# Patient Record
Sex: MALE
[De-identification: names, ages, dates, MRNs, and addresses within clinical notes are randomized per-mention and may not be internally consistent; named-entity substitution may affect disease eponyms.]

---

## 2017-05-12 NOTE — CP.PCM.HP
History of Present Illness





- History of Present Illness


History of Present Illness: 


CC:acute diplopia, mild dysarthria and aphasia x a few minutes





51 y/o M with Hx of CVA 2015, A fib, HTN, CHF, dilated cardiomyopathy ( EF 10-15

% on last ECHO 3/17, s/p AICD placement 2015), Polycythemia Vera presenting 

with diplopia and mild dysarthria and aphasia which started at 4 pm as per 

patient, lasted for a few minutes and resolved on its own.  Associated with 

some dizziness. No chest pain, sob, cough, focal weakness or numbness, no LOC, 

no incontinence. 





PMH: Polycythemia Vera, CVA 12/15, A fib,HTN, CHF, dilated cardiomyopathy ( EF 

10-15% on last ECHO 3/17, s/p AICD placement 2015)


PSH: AICD 2015, cardiac cath 2014, cholecystectomy


Allergies: NKDA


Meds: per ECW


Taking Aspir-81 81 MG Tablet Delayed Release 


Taking Atorvastatin Calcium 40 MG Tablet 


Taking Digoxin 125 MCG Tablet 


Continue Lisinopril 10 MG Tablet 


Continue Carvedilol 12.5 MG Tablet 


Continue Furosemide 20 MG Tablet 


Continue Warfarin Sodium 2.5 MG Tablet





PMD: Dr Quinn


Cardio: Dr Kessler


Heme/Onc: Dr Montoya





ED course:


NIHSS score 2


code stroke


neuro consulted


ct head w/o contrast: no acute hemorrhage


cbc,cmp, lipids, coags, troponin x1


EKG, CXR








Present on Admission





- Present on Admission


Any Indicators Present on Admission: No





Review of Systems





- Review of Systems


Review of Systems: 





see hpi





Past Patient History





- Infectious Disease


Hx of Infectious Diseases: None





- Past Social History


Smoking Status: Never Smoked





- CARDIAC


Hx Atrial Fibrillation: Yes (pt had been on coumadin but recently switched to 

zarelta)


Hx Cardia Arrhythmia: Yes


Hx Congestive Heart Failure: Yes


Hx Hypertension: Yes


Hx Pacemaker: No





- NEUROLOGICAL


HX Cerebrovascular Accident: Yes (no residual weakness)


Hx Paralysis: No





- RENAL


Hx Chronic Kidney Disease: No





- ENDOCRINE/METABOLIC


Hx Endocrine Disorders: No





- HEMATOLOGICAL/ONCOLOGICAL


Hx Blood Transfusions: No


Hx Blood Transfusion Reaction: No





- INTEGUMENTARY


Hx Dermatological Problems: No





- MUSCULOSKELETAL/RHEUMATOLOGICAL


Hx Musculoskeletal Disorders: No





- GASTROINTESTINAL


Hx Gastrointestinal Disorders: No





- GENITOURINARY/GYNECOLOGICAL


Hx Genitourinary Disorders: No





- PSYCHIATRIC


Hx Psychophysiologic Disorder: No





- SURGICAL HISTORY


Hx Cholecystectomy: Yes





- ANESTHESIA


Hx Anesthesia Reactions: No


Hx Malignant Hyperthermia: No





Meds


Allergies/Adverse Reactions: 


 Allergies











Allergy/AdvReac Type Severity Reaction Status Date / Time


 


No Known Allergies Allergy   Verified 03/27/17 10:32














Physical Exam





- Constitutional


Appears: Non-toxic, No Acute Distress





- Head Exam


Head Exam: ATRAUMATIC





- Eye Exam


Eye Exam: EOMI


Pupil Exam: PERRL





- ENT Exam


ENT Exam: Mucous Membranes Moist





- Neck Exam


Neck exam: Positive for: Full Rom.  Negative for: Tenderness





- Respiratory Exam


Respiratory Exam: Clear to Auscultation Bilateral





- Cardiovascular Exam


Cardiovascular Exam: +S1, +S2





- GI/Abdominal Exam


GI & Abdominal Exam: Normal Bowel Sounds, Soft.  absent: Tenderness





- Extremities Exam


Extremities exam: Positive for: pedal pulses present.  Negative for: calf 

tenderness, pedal edema





- Neurological Exam


Neurological exam: Alert, CN II-XII Intact, Oriented x3





- Expanded Neurological Exam


  ** Expanded


Patient oriented to: person, place, time


Cranial nerves: EOM's Intact: Normal, Facial Sensation: Normal, Nystagmus: 

Normal, Tongue Deviation: Normal


Cerebellar Function: Finger to Nose: Normal, Romberg: Normal


Neuro motor strength exam: Left Upper Extremity: 5, Right Upper Extremity: 5, 

Left Lower Extremity: 5, Right Lower Extremity: 5





- Psychiatric Exam


Psychiatric exam: Normal Affect, Normal Mood





Results





- Vital Signs


Recent Vital Signs: 





 Last Vital Signs











Temp  98.1 F   05/12/17 17:20


 


Pulse  100 H  05/12/17 17:20


 


Resp  17   05/12/17 17:20


 


BP  140/88   05/12/17 17:20


 


Pulse Ox  100   05/12/17 18:09














- Labs


Result Diagrams: 


 05/12/17 17:50





 05/12/17 17:50


Labs: 





 Laboratory Results - last 24 hr











  05/12/17 05/12/17





  17:50 17:50


 


WBC  7.5 


 


RBC  5.47 


 


Hgb  17.0 


 


Hct  50.7 


 


MCV  92.7 


 


MCH  31.0 


 


MCHC  33.5 


 


RDW  13.3 


 


Plt Count  161 


 


MPV  10.0 


 


Neut % (Auto)  53.0 


 


Lymph % (Auto)  32.9 


 


Mono % (Auto)  6.9 


 


Eos % (Auto)  6.5 H 


 


Baso % (Auto)  0.7 


 


Neut #  4.0 


 


Lymph #  2.4 


 


Mono #  0.5 


 


Eos #  0.5 


 


Baso #  0.0 


 


Sodium   143


 


Potassium   3.9


 


Chloride   105


 


Carbon Dioxide   27


 


Anion Gap   14


 


BUN   15


 


Creatinine   0.8


 


Est GFR ( Amer)   > 60


 


Est GFR (Non-Af Amer)   > 60


 


Random Glucose   98


 


Calcium   8.8


 


Total Bilirubin   0.5


 


AST   34


 


ALT   53


 


Alkaline Phosphatase   69


 


Total Protein   7.8


 


Albumin   4.4


 


Globulin   3.4


 


Albumin/Globulin Ratio   1.3


 


Triglycerides   240 H D


 


Cholesterol   109


 


HDL Cholesterol   35














Assessment & Plan





- Assessment and Plan (Free Text)


Plan: 





51 y/o M with Hx of CVA 2015, A fib, HTN, CHF, dilated cardiomyopathy ( EF 10-15

% on last ECHO 3/17, s/p AICD placement 2015), Polycythemia Vera presenting 

with diplopia and mild dysarthria and aphasia which started at 4 pm as per 

patient, lasted for a few minutes and resolved on its own.  





Diplopia w/ mild dysarthia 


resolved upon coming to the ED, poss 2/2 TIA


ED course:


NIHSS score 2


code stroke


neuro consulted: recommend MRA, MRI but patient has AICD placed


ct head w/o contrast: no acute hemorrhage


swallow screen passed


cbc,cmp, lipids, coags, troponin x1


EKG, CXR


admit to telemetry


neurochecks Q4


cardiac monitoring


BP controlled: c/w lisinopril


c/w lipitor 40 mg daily


continue with home med warfarin since no acute hemorrhage on CT


labs in AM


await neuro recommendations since we cannot perform MRI/MRA due to AICD


PT/OT eval and treat





Afib


c/w warfarin 2.5mg





DCM


AICD placed


EF 10-15% with severe global hypokinesia of ventricle on last ECHO 3/17


c/w lasix daily, ACE, BB





Polycythemia Vera


Hgb 17 


patient sees Dr Montoya as outpatient, no medical intervention for now as per 

patient





PPx


DVT - anticoagulated with warfarin for a fib





Diet 


HH

## 2017-05-12 NOTE — RAD
HISTORY:

code stroke  



COMPARISON:

Comparison chest 12/31/2014 



FINDINGS:



LUNGS:

No active pulmonary disease.



PLEURA:

No significant pleural effusion identified, no pneumothorax apparent.



CARDIOVASCULAR:

Cardiomegaly. Interval placement bipolar pacemaker/defibrillator. 



OSSEOUS STRUCTURES:

No significant abnormalities.



VISUALIZED UPPER ABDOMEN:

Normal.



OTHER FINDINGS:

None.



IMPRESSION:

No acute consolidation.  Cardiomegaly. Interval placement bipolar 

pacemaker/defibrillator.

## 2017-05-12 NOTE — CT
PROCEDURE:  CT scan brain 05/12/2017 



HISTORY:

blurred vision, mild slurred speech



COMPARISON:

No prior study available comparison. 



TECHNIQUE:

Axial computed tomography images were obtained through the head/brain 

without intravenous contrast.  



Radiation dose:



Total exam DLP = 794.87 mGy-cm.



This CT exam was performed using one or more of the following dose 

reduction techniques: Automated exposure control, adjustment of the 

mA and/or kV according to patient size, and/or use of iterative 

reconstruction technique.



FINDINGS:



HEMORRHAGE:

No acute parenchymal, subarachnoid nor extra-axial hemorrhage. 



BRAIN:

Vague area of low attenuation right anterior superior basal ganglia 

consistent with infarct in this location though age-indeterminate ; 

possibility of an acute infarct in this location not excluded.  The 

possibility of an acute component infarct however underlying acute 

infarct cannot be completely excluded Chronic appearing infarct in 

the left posterior corona radiata extending superiorly into left 

inferior centrum semiovale. This infarct appears chronic though not 

appreciated on prior exam. 



In addition, there is a chronic appearing infarct in the left frontal 

subcortical white matter that appears to extend into the anterior 

superior left basal ganglia which was partially visible on the prior 

study. .  This may have represented an acute or subacute infarct on 

the prior exam 



Mild generalized volume loss 



VENTRICLES:

No obstructive hydrocephalus 



CALVARIUM:

No acute calvarial fractures



PARANASAL SINUSES:

Mild to moderate mucosal thickening within the ethmoid air complex 

extending superiorly into the frontal sinus. There is also minor 

mucosal thickening sphenoid sinus.



MASTOID AIR CELLS:

Unremarkable as visualized. No inflammatory changes.



OTHER FINDINGS:

None.



IMPRESSION:

No acute intracranial hemorrhage.  Chronic on infarct left posterior 

coronal radiata extending superior to the left centrum semiovale.  

There is also chronic infarct in the left left frontal subcortical 

white matter which was partially visible on the prior exam.  This 

could have represented an acute or subacute infarct on that prior 

study. There is a 3rd small vague and elliptical shaped area low 

attenuation right anterior superior basal ganglia that most likely 

represents age-indeterminate infarct.  Acute infarct in this location 

not excluded.  Consider followup emergent MRI at patient is currently 

within a treatment window for the tPA therapy. .  Note that these 

findings were discussed with Dr. Esposito at approximately 5:50 p.m. with 

written down and read back verification.



Mild generalized volume loss.



Cyst 



See above discussion for additional findings and details.

## 2017-05-12 NOTE — ED PDOC
HPI:STROKE





- Historian


Historian: Patient (acute onset of blurred vision that resolves in minutes and 

difficulty with speech. h/o CVA in the past. no motor weakness that's new noted.

)





NIHSS Stroke Scale





- Date/Time Evaluation Performed


When Was NIHSS Performed: Baseline





- How Severe is the Stroke


Level of Consciousness: 0=Alert


LOC to Questions: 0=Both comments correct


LOC to commands: 0=Obeys both correctly


Best Gaze: 0=Normal


Visual: 0=No visual loss


Facial: 0=Normal


Motor Arm - Left: 0=No drift


Motor Arm - Right: 0=No drift


Motor Leg - Left: 0=No drift


Motor Leg - Right: 0=No drift


Limb Ataxia: 0=Absent


Sensory: 0=Normal


Best Language: 1=Mild to moderate aphasia


Dysarthia: 1=Mild to moderate slurring


Extinction & Inattention (Neglect): 0=Normal, no object


Score: 2





rTPA Inclusion/Exclusion





- Refusal of Treatment


Patient Refused Treatment: No





- Inclusion Criteria for Altepase


Patient is 18 years or Older: Yes


The Clinical Diagnosis of Ischemic Stroke That is Causing a Potentially 

Disabling Neurological Deficit: No


Time of Onset is Well Established to be Less Than 270 Minute Before Treatment 

Would Begin: No


Risk/Benefit Discussed With Patient/Family Member Present: Yes





- Exclusion Criteria for Altepase


Uncontrolled Hypertension at Time of Treatment (Systolic BP above 185 or 

Diastolic BP above 110 mmHg): No


Active Internal Bleeding: No


Known Bleeding Diathesis Including but Not Limited to: Platelets Below 100,000/

mm,PTT Above 40 sec After Heparin Use, Current Use of Oral Anitcoagulant With 

INR Greater Than 1.7 or PT Greater Than 15 secs: No


Evidence of an Intracranial Hemorrhage: No


Evidence of Major Acute Infarct With Signs Greater Than 1/3 MCA Territory: No


Suspicion of Subarachnoid Hemorrhage on Pretreatment Evaluation Even if CT Head 

Negative For Hemorrhage: No





- Warning to TPA With Conditions


Following Conditions Weighed Against Anticipated Benefit: Yes


Condition: Stroke Serevity Too Mild





Past Medical History


Reviewed: Historical Data, Nursing Documentation, Vital Signs


Vital Signs: 





 Last Vital Signs











Temp  98.1 F   05/12/17 17:20


 


Pulse  100 H  05/12/17 17:20


 


Resp  17   05/12/17 17:20


 


BP  140/88   05/12/17 17:20


 


Pulse Ox  100   05/12/17 17:20














- Medical History


PMH: Atrial Fibrillation (pt had been on coumadin but recently switched to 

zarelta), Cardia Arrhythmia, CHF, HTN


   Denies: Chronic Kidney Disease





- Surgical History


Surgical History: Cholecystectomy


   Denies: Pacemaker





- Family History


Family History: States: No Known Family Hx





- Living Arrangements


Living Arrangements: With Family





- Social History


Current smoker - smoking cessation education provided: No





- Home Medications


Home Medications: 


 Ambulatory Orders











 Medication  Instructions  Recorded


 


Famotidine [Famotidine] 20 mg PO DAILY 03/25/14


 


Warfarin [Coumadin] 2.5 mg PO DAILY 08/11/14


 


Furosemide [Furosemide] 20 mg PO DAILY 12/31/14


 


Aspirin [Aspirin Chewable] 81 mg PO DAILY 02/01/17


 


Carvedilol [Coreg] 12.5 mg PO BID 02/01/17


 


Lisinopril [Zestril] 10 mg PO DAILY 02/01/17


 


Digoxin [Lanoxin] 0.125 mg PO 03/27/17














- Allergies


Allergies/Adverse Reactions: 


 Allergies











Allergy/AdvReac Type Severity Reaction Status Date / Time


 


No Known Allergies Allergy   Verified 03/27/17 10:32














Review of Systems


ROS Statement: Except As Marked, All Systems Reviewed And Found Negative


Neurological: Positive for: Change in Speech, Other (transiently blurred vision)





Physical Exam





- Reviewed


Nursing Documentation Reviewed: Yes


Vital Signs Reviewed: Yes





- Physical Exam


Appears: Positive for: Well, Non-toxic, No Acute Distress


Head Exam: Positive for: ATRAUMATIC, NORMAL INSPECTION, NORMOCEPHALIC


Skin: Positive for: Normal Color, Warm, DRY


Eye Exam: Positive for: EOMI, Normal appearance, PERRL


ENT: Positive for: Normal ENT Inspection


Neck: Positive for: Normal, Painless ROM


Cardiovascular/Chest: Positive for: Regular Rate, Rhythm


Respiratory: Positive for: CNT, Normal Breath Sounds


Gastrointestinal/Abdominal: Positive for: Normal Exam, Bowel Sounds, Soft


Back: Positive for: Normal Inspection


Extremity: Positive for: Normal ROM


Neurologic/Psych: Positive for: Alert, CNs II-XII, Oriented, Motor/Sensory 

Deficits, Mood/Affect, Cerebellar Tests, Aphasia (mild).  Negative for: Facial 

Droop





- ECG


O2 Sat by Pulse Oximetry: 100





- Radiology


X-Ray: Read By Radiologist





- Critical Care


Total Time (In Min): 30





Medical Decision Making


Medical Decision Making: 


case d/w Dr. Lemos. Admit for workup. 





Disposition





- Clinical Impression


Clinical Impression: 


 Blurred vision, bilateral, Dysarthria, TIA (transient ischemic attack), Stroke








- Patient ED Disposition


Is Patient to be Admitted: Yes


Doctor Will See Patient In The: Hospital


Counseled Patient/Family Regarding: Diagnosis





- Disposition


Disposition: Transfer of Care


Disposition Time: 18:09


Condition: GUARDED





- Pt Status Changed To:


Hospital Disposition Of: Inpatient





- Admit Certification


Admit to Inpatient:: After my assessment, the patient will require 

hospitalization for at least two midnights.  This is because of the severity of 

symptoms shown, intensity of services needed, and/or the medical risk in this 

patient being treated as an outpatient.





- POA


Present On Arrival: None

## 2017-05-13 NOTE — CP.PCM.CON
History of Present Illness





- History of Present Illness


History of Present Illness: 





                                                 Full Note Dictated.














                                                  ?? TIA/?? fresh cerebral 

embolism


                                                  Congestive cardiomyopathy


                                                  Chr A Fib


                                                  S/P AICD Implant


                                                  Therapeutically anticoagulated


                                                  








                                                   Stable from cardiac point of 

view.





Past Patient History





- Infectious Disease


Hx of Infectious Diseases: None





- Past Medical History & Family History


Past Medical History?: Yes





- Past Social History


Smoking Status: Never Smoked





- CARDIAC


Hx Cardiac Disorders: Yes


Hx Atrial Fibrillation: Yes


Hx Cardia Arrhythmia: Yes


Hx Congestive Heart Failure: Yes


Hx Hypertension: Yes


Hx Pacemaker: Yes


Other/Comment: Dilated cardiomyopathy





- PULMONARY


Hx Respiratory Disorders: No





- NEUROLOGICAL


Hx Neurological Disorder: Yes


HX Cerebrovascular Accident: Yes (no residual weakness)





- HEENT


Hx HEENT Problems: No





- RENAL


Hx Chronic Kidney Disease: No





- ENDOCRINE/METABOLIC


Hx Endocrine Disorders: No





- HEMATOLOGICAL/ONCOLOGICAL


Hx Blood Disorders: Yes


Other/Comment: Polycythemia





- INTEGUMENTARY


Hx Dermatological Problems: No





- MUSCULOSKELETAL/RHEUMATOLOGICAL


Hx Musculoskeletal Disorders: No


Hx Falls: No





- GASTROINTESTINAL


Hx Gastrointestinal Disorders: No





- GENITOURINARY/GYNECOLOGICAL


Hx Genitourinary Disorders: No





- PSYCHIATRIC


Hx Psychophysiologic Disorder: No


Hx Substance Use: No





- SURGICAL HISTORY


Hx Surgeries: Yes


Hx Cholecystectomy: Yes


Other/Comment: Pacemaker Insertion





- ANESTHESIA


Hx Anesthesia: Yes


Hx Anesthesia Reactions: No


Hx Malignant Hyperthermia: No





Meds


Allergies/Adverse Reactions: 


 Allergies











Allergy/AdvReac Type Severity Reaction Status Date / Time


 


No Known Allergies Allergy   Verified 03/27/17 10:32














- Medications


Medications: 


 Current Medications





Aspirin (Aspirin Chewable)  81 mg PO DAILY [facility]


   Last Admin: 05/13/17 08:49 Dose:  81 mg


Atorvastatin Calcium (Lipitor)  40 mg PO DAILY [facility]


   Last Admin: 05/13/17 08:48 Dose:  40 mg


Carvedilol (Coreg)  12.5 mg PO BID [facility]


   Last Admin: 05/13/17 08:50 Dose:  12.5 mg


Digoxin (Lanoxin)  0.125 mg PO DAILY [facility]


   Last Admin: 05/13/17 08:49 Dose:  0.125 mg


Furosemide (Lasix)  10 mg PO DAILY [facility]


   Last Admin: 05/13/17 08:49 Dose:  10 mg


Lisinopril (Zestril)  10 mg PO DAILY [facility]


   Last Admin: 05/13/17 08:48 Dose:  10 mg


Ondansetron HCl (Zofran Inj)  4 mg IVP Q6 PRN


   PRN Reason: Nausea/Vomiting











Results





- Vital Signs


Recent Vital Signs: 


 Last Vital Signs











Temp  97.4 F L  05/13/17 08:23


 


Pulse  71   05/13/17 08:50


 


Resp  20   05/13/17 08:23


 


BP  131/87   05/13/17 08:50


 


Pulse Ox  99   05/13/17 08:23














- Labs


Result Diagrams: 


 05/13/17 06:00





 05/13/17 06:00


Labs: 


 Laboratory Results - last 24 hr











  05/13/17 05/13/17





  06:00 06:00


 


WBC  7.3 


 


RBC  5.39 


 


Hgb  16.7 


 


Hct  50.5 


 


MCV  93.7 


 


MCH  30.9 


 


MCHC  33.0 


 


RDW  13.6 


 


Plt Count  152 


 


Sodium   142


 


Potassium   4.8


 


Chloride   106


 


Carbon Dioxide   28


 


Anion Gap   13


 


BUN   17


 


Creatinine   0.9


 


Est GFR ( Amer)   > 60


 


Est GFR (Non-Af Amer)   > 60


 


Random Glucose   103


 


Calcium   8.8


 


Total Bilirubin   0.8


 


AST   49


 


ALT   48


 


Alkaline Phosphatase   68


 


Total Protein   7.0


 


Albumin   3.8


 


Globulin   3.2


 


Albumin/Globulin Ratio   1.2

## 2017-05-13 NOTE — CP.PCM.PN
Subjective





- Date & Time of Evaluation


Date of Evaluation: 05/13/17


Time of Evaluation: 08:45





- Subjective


Subjective: 





Pt is being following up for Code stroke. 


No overnight event: afebrile


Pt is walking around in his room, NAD, normal gait.  Pt states  his symptoms of 

gait imbalance, dysarthria, and aphasia has resolved.  


Pt reports his baseline neurological wise.  Denies new focal neurological 

deficits, dropping of face, extremity numbness/tingling.  


Denies fever, nausea, chills, sob, palpitation, headache.  





Objective





- Vital Signs/Intake and Output


Vital Signs (last 24 hours): 


 











Temp Pulse Resp BP Pulse Ox


 


 97.4 F L  71   20   131/87   99 


 


 05/13/17 09:00  05/13/17 09:00  05/13/17 09:00  05/13/17 09:00  05/13/17 09:00











- Medications


Medications: 


 Current Medications





Aspirin (Aspirin Chewable)  81 mg PO DAILY [facility]


   Last Admin: 05/13/17 08:49 Dose:  81 mg


Atorvastatin Calcium (Lipitor)  40 mg PO DAILY [facility]


   Last Admin: 05/13/17 08:48 Dose:  40 mg


Carvedilol (Coreg)  12.5 mg PO BID [facility]


   Last Admin: 05/13/17 08:50 Dose:  12.5 mg


Digoxin (Lanoxin)  0.125 mg PO DAILY [facility]


   Last Admin: 05/13/17 08:49 Dose:  0.125 mg


Furosemide (Lasix)  10 mg PO DAILY [facility]


   Last Admin: 05/13/17 08:49 Dose:  10 mg


Lisinopril (Zestril)  10 mg PO DAILY [facility]


   Last Admin: 05/13/17 08:48 Dose:  10 mg


Ondansetron HCl (Zofran Inj)  4 mg IVP Q6 PRN


   PRN Reason: Nausea/Vomiting











- Labs


Labs: 


 





 05/13/17 06:00 





 05/13/17 06:00 





 











PT  24.6 SECONDS (9.6-11.2)  H  05/12/17  17:50    


 


INR  2.37  (0.92-1.08)  H  05/12/17  17:50    


 


APTT  36.4 SECONDS (23.3-32.5)  H  05/12/17  17:50    














- Constitutional


Appears: Non-toxic, No Acute Distress





- Head Exam


Head Exam: ATRAUMATIC





- Eye Exam


Eye Exam: EOMI, Normal appearance





- ENT Exam


ENT Exam: Mucous Membranes Moist





- Neck Exam


Neck Exam: Full ROM





- Respiratory Exam


Respiratory Exam: Clear to Ausculation Bilateral





- Cardiovascular Exam


Cardiovascular Exam: REGULAR RHYTHM, +S1, +S2.  absent: Murmur





- GI/Abdominal Exam


GI & Abdominal Exam: Soft, Normal Bowel Sounds.  absent: Distended, Tenderness





- Extremities Exam


Extremities Exam: Full ROM, Normal Capillary Refill.  absent: Calf Tenderness, 

Pedal Edema, Tenderness





- Back Exam


Back Exam: Full ROM, NORMAL INSPECTION.  absent: CVA tenderness (L), CVA 

tenderness (R)





- Neurological Exam


Neurological Exam: Alert, Awake, CN II-XII Intact, Normal Gait, Oriented x3


Neuro motor strength exam: Left Upper Extremity: 4, Right Upper Extremity: 4





- Psychiatric Exam


Psychiatric exam: Anxious, Normal Affect, Normal Mood





- Skin


Skin Exam: Normal Color, Warm





Assessment and Plan





- Assessment and Plan (Free Text)


Assessment: 





51 y/o M with Hx of CVA 2015, A fib, HTN, CHF, dilated cardiomyopathy ( EF 10-15

% on last ECHO 3/17, s/p AICD placement 2015), Polycythemia Vera presenting 

with diplopia and mild dysarthria and aphasia which started at 4 pm as per 

patient, lasted for a few minutes and resolved on its own admitted for Code 

Stroke. 





Diplopia w/ mild dysarthia


-resolved, most likely TIA


-Neurology consult appreciated, Dr. Lemos


   due to AICD MRA, MRI held, recommend CTa of head and neck


-Code stroke


-NIHSS score 2, swallow screen passed


-ct head w/o contrast: no acute hemorrhage


- cxr: no acute disease


-continue with aspirin, lipitor, warfarin 2.5mg


-awaiting PT/OT eval and treat





HTN: 


BP controlled


c/w lisinopril


c/w lipitor 40 mg daily








Afib


-ACID intact


-ekg in ED: ventricular pace


-c/w warfarin 2.5mg


-f/u repeat INR





CHF


EF 10-15% with severe global hypokinesia of ventricle on last ECHO 3/17


c/w lasix daily, ACE, BB





Polycythemia Vera


-awaiting heme/onc consult, Dr. Montoya


-history of phlebotomy


-H:H 17:50





PPx


DVT - anticoagulated with warfarin for a fib





Diet 


HH

## 2017-05-13 NOTE — CARD
--------------- APPROVED REPORT --------------





EKG Measurement

Heart [id number]

[id number]

[id number]

[id number]



<Conclusion>

Ventricular-paced rhythm

Abnormal ECG

## 2017-05-13 NOTE — CP.PCM.CON
History of Present Illness





- History of Present Illness


History of Present Illness: 





52 year old male with a history of CVA, HTN, CHF with cardiomyopathy and AICD, 

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 

phlebotomy, admitted with visual disturbance secondary to suspected TIA.  The 

patient notes to increased stress for about 2 weeks which he thinks led to his 

vision showing the rooms moving up and down.  He denies fevers and chills.  He 

has no nausea and vomiting.  He has no headache and notes his vision is back to 

normal.





Past medical history:  CVA, HTN, CHF with cardiomyopathy and AICD, afib on 

coumadin, secondary polycythemia (JAK2 negative) on intermittent phlebotomy





Past surgical history:  None





Family history:  Denies hematologic and oncologic problems.





Social history:  Denies tobacco, alcohol, and illicit drug use.





Allergies:  NKA





Review of systems:  All remaining review of systems including HEENT, 

cardiovascular, respiratory, gastrointestinal, genitourinary, musculoskeletal, 

dermatologic, neurologic, and psychiatric are negative unless mentioned in the 

HPI.





Past Patient History





- Infectious Disease


Hx of Infectious Diseases: None





- Past Medical History & Family History


Past Medical History?: Yes





- Past Social History


Smoking Status: Never Smoked





- CARDIAC


Hx Cardiac Disorders: Yes


Hx Atrial Fibrillation: Yes


Hx Cardia Arrhythmia: Yes


Hx Congestive Heart Failure: Yes


Hx Hypertension: Yes


Hx Pacemaker: Yes


Other/Comment: Dilated cardiomyopathy





- PULMONARY


Hx Respiratory Disorders: No





- NEUROLOGICAL


Hx Neurological Disorder: Yes


HX Cerebrovascular Accident: Yes (no residual weakness)





- HEENT


Hx HEENT Problems: No





- RENAL


Hx Chronic Kidney Disease: No





- ENDOCRINE/METABOLIC


Hx Endocrine Disorders: No





- HEMATOLOGICAL/ONCOLOGICAL


Hx Blood Disorders: Yes


Other/Comment: Polycythemia





- INTEGUMENTARY


Hx Dermatological Problems: No





- MUSCULOSKELETAL/RHEUMATOLOGICAL


Hx Musculoskeletal Disorders: No


Hx Falls: No





- GASTROINTESTINAL


Hx Gastrointestinal Disorders: No





- GENITOURINARY/GYNECOLOGICAL


Hx Genitourinary Disorders: No





- PSYCHIATRIC


Hx Psychophysiologic Disorder: No


Hx Substance Use: No





- SURGICAL HISTORY


Hx Surgeries: Yes


Hx Cholecystectomy: Yes


Other/Comment: Pacemaker Insertion





- ANESTHESIA


Hx Anesthesia: Yes


Hx Anesthesia Reactions: No


Hx Malignant Hyperthermia: No





Meds


Allergies/Adverse Reactions: 


 Allergies











Allergy/AdvReac Type Severity Reaction Status Date / Time


 


No Known Allergies Allergy   Verified 03/27/17 10:32














- Medications


Medications: 


 Current Medications





Aspirin (Aspirin Chewable)  81 mg PO DAILY [facility]


   Last Admin: 05/13/17 08:49 Dose:  81 mg


Atorvastatin Calcium (Lipitor)  40 mg PO DAILY [facility]


   Last Admin: 05/13/17 08:48 Dose:  40 mg


Carvedilol (Coreg)  12.5 mg PO BID [facility]


   Last Admin: 05/13/17 16:21 Dose:  12.5 mg


Digoxin (Lanoxin)  0.125 mg PO DAILY [facility]


   Last Admin: 05/13/17 08:49 Dose:  0.125 mg


Furosemide (Lasix)  10 mg PO DAILY [facility]


   Last Admin: 05/13/17 08:49 Dose:  10 mg


Lisinopril (Zestril)  10 mg PO DAILY [facility]


   Last Admin: 05/13/17 08:48 Dose:  10 mg


Ondansetron HCl (Zofran Inj)  4 mg IVP Q6 PRN


   PRN Reason: Nausea/Vomiting











Physical Exam





- Head Exam


Head Exam: ATRAUMATIC





- Eye Exam


Eye Exam: Normal appearance





- ENT Exam


ENT Exam: Mucous Membranes Dry





- Respiratory Exam


Respiratory Exam: NORMAL BREATHING PATTERN





- Cardiovascular Exam


Cardiovascular Exam: +S1, +S2





- GI/Abdominal Exam


GI & Abdominal Exam: Normal Bowel Sounds





- Extremities Exam


Extremities exam: Positive for: normal inspection





- Neurological Exam


Neurological exam: Oriented x3





- Psychiatric Exam


Psychiatric exam: Normal Affect, Normal Mood





- Skin


Skin Exam: Warm





Results





- Vital Signs


Recent Vital Signs: 


 Last Vital Signs











Temp  98.1 F   05/13/17 15:41


 


Pulse  71   05/13/17 16:21


 


Resp  20   05/13/17 15:41


 


BP  114/61   05/13/17 16:21


 


Pulse Ox  98   05/13/17 15:41














- Labs


Result Diagrams: 


 05/14/17 06:00





 05/13/17 06:00


Labs: 


 Laboratory Results - last 24 hr











  05/13/17 05/13/17





  06:00 06:00


 


WBC  7.3 


 


RBC  5.39 


 


Hgb  16.7 


 


Hct  50.5 


 


MCV  93.7 


 


MCH  30.9 


 


MCHC  33.0 


 


RDW  13.6 


 


Plt Count  152 


 


Sodium   142


 


Potassium   4.8


 


Chloride   106


 


Carbon Dioxide   28


 


Anion Gap   13


 


BUN   17


 


Creatinine   0.9


 


Est GFR ( Amer)   > 60


 


Est GFR (Non-Af Amer)   > 60


 


Random Glucose   103


 


Calcium   8.8


 


Total Bilirubin   0.8


 


AST   49


 


ALT   48


 


Alkaline Phosphatase   68


 


Total Protein   7.0


 


Albumin   3.8


 


Globulin   3.2


 


Albumin/Globulin Ratio   1.2














Assessment & Plan


(1) Secondary polycythemia


Assessment and Plan: 


JAK2 negative


repeat erythropoietin level


outpatient phlebotomy











Status: Acute   





(2) Coagulopathy


Assessment and Plan: 


secondary to anticoagulation





Thank you for this interesting consult.


Status: Acute

## 2017-05-13 NOTE — CP.PCM.CON
History of Present Illness





- History of Present Illness


History of Present Illness: 





Mr. Plascencia is a 52-year-old man with a past medical history of left MCA region 

ischemic stroke in 2015, A fib, HTN, CHF, dilated cardiomyopathy (EF 10-15% on 

last ECHO 3/17, s/p AICD placement 2015), Polycythemia Vera, who presented to 

the ED after an episode of vertigo and visual changes that lasted for several 

minutes and resolved completely.  He states that he has had an additional 

amount of stress at home recently and he has not been feeling well.  Otherwise, 

he does not have any residual symptoms of visual changes and is not currently 

vertiginous.  He has slight right side weakness from the previous stroke, but 

he states that he recovered about 95% of his function.  He denied headache, 

nausea, slurred speech, word-finding difficulty, other weakness, sensory 

changes or difficulty with ambulation. He denied other associated symptoms. 





Review of Systems





- Review of Systems


All systems: reviewed and no additional remarkable complaints except





Past Patient History





- Infectious Disease


Hx of Infectious Diseases: None





- Past Medical History & Family History


Past Medical History?: Yes





- Past Social History


Smoking Status: Never Smoked





- CARDIAC


Hx Cardiac Disorders: Yes


Hx Atrial Fibrillation: Yes


Hx Cardia Arrhythmia: Yes


Hx Congestive Heart Failure: Yes


Hx Hypertension: Yes


Hx Pacemaker: Yes


Other/Comment: Dilated cardiomyopathy





- PULMONARY


Hx Respiratory Disorders: No





- NEUROLOGICAL


Hx Neurological Disorder: Yes


HX Cerebrovascular Accident: Yes (no residual weakness)





- HEENT


Hx HEENT Problems: No





- RENAL


Hx Chronic Kidney Disease: No





- ENDOCRINE/METABOLIC


Hx Endocrine Disorders: No





- HEMATOLOGICAL/ONCOLOGICAL


Hx Blood Disorders: Yes


Other/Comment: Polycythemia





- INTEGUMENTARY


Hx Dermatological Problems: No





- MUSCULOSKELETAL/RHEUMATOLOGICAL


Hx Musculoskeletal Disorders: No


Hx Falls: No





- GASTROINTESTINAL


Hx Gastrointestinal Disorders: No





- GENITOURINARY/GYNECOLOGICAL


Hx Genitourinary Disorders: No





- PSYCHIATRIC


Hx Psychophysiologic Disorder: No


Hx Substance Use: No





- SURGICAL HISTORY


Hx Surgeries: Yes


Hx Cholecystectomy: Yes


Other/Comment: Pacemaker Insertion





- ANESTHESIA


Hx Anesthesia: Yes


Hx Anesthesia Reactions: No


Hx Malignant Hyperthermia: No





Meds


Allergies/Adverse Reactions: 


 Allergies











Allergy/AdvReac Type Severity Reaction Status Date / Time


 


No Known Allergies Allergy   Verified 03/27/17 10:32














- Medications


Medications: 


 Current Medications





Aspirin (Aspirin Chewable)  81 mg PO DAILY [facility]


   Last Admin: 05/13/17 08:49 Dose:  81 mg


Atorvastatin Calcium (Lipitor)  40 mg PO DAILY [facility]


   Last Admin: 05/13/17 08:48 Dose:  40 mg


Carvedilol (Coreg)  12.5 mg PO BID [facility]


   Last Admin: 05/13/17 08:50 Dose:  12.5 mg


Digoxin (Lanoxin)  0.125 mg PO DAILY [facility]


   Last Admin: 05/13/17 08:49 Dose:  0.125 mg


Furosemide (Lasix)  10 mg PO DAILY [facility]


   Last Admin: 05/13/17 08:49 Dose:  10 mg


Lisinopril (Zestril)  10 mg PO DAILY [facility]


   Last Admin: 05/13/17 08:48 Dose:  10 mg


Ondansetron HCl (Zofran Inj)  4 mg IVP Q6 PRN


   PRN Reason: Nausea/Vomiting











Physical Exam





- Constitutional


Appears: Well





- Head Exam


Head Exam: ATRAUMATIC, NORMAL INSPECTION, NORMOCEPHALIC





- Eye Exam


Eye Exam: EOMI, Normal appearance, PERRL


Pupil Exam: NORMAL ACCOMODATION, PERRL





- ENT Exam


ENT Exam: Mucous Membranes Moist, Normal Exam





- Neck Exam


Neck exam: Positive for: Normal Inspection





- Respiratory Exam


Respiratory Exam: Clear to Auscultation Bilateral, NORMAL BREATHING PATTERN





- Cardiovascular Exam


Cardiovascular Exam: REGULAR RHYTHM, +S1, +S2





- GI/Abdominal Exam


GI & Abdominal Exam: Normal Bowel Sounds, Soft.  absent: Tenderness





- Rectal Exam


Rectal Exam: Deferred





- Extremities Exam


Extremities exam: Positive for: normal inspection





- Back Exam


Back exam: NORMAL INSPECTION





- Neurological Exam


Neurological exam: Alert, CN II-XII Intact, Normal Gait, Oriented x3, Reflexes 

Normal





- Expanded Neurological Exam


  ** Expanded


Cranial nerves: EOM's Intact: Normal, Facial Sensation: Normal, Nystagmus: 

Normal


Cerebellar Function: Finger to Nose: Normal, Heel to Shin: Normal, Romberg: 

Normal


Upper motor neuron: Babinski Sign: Normal


Sensory exam: Lower Extremity 2 Point Discrimination: Normal, Lower Extremity 

Light Touch: Normal, Lower Extremity Pin Prick: Normal, Lower Extremity 

Temperature: Normal, Upper Extremity 2 Point Discrimination: Normal, Upper 

Extremity Light Touch: Normal, Upper Extremity Pin Prick: Normal, Upper 

Extremity Temperature: Normal


Neuro motor strength exam: Left Upper Extremity: 5, Right Upper Extremity: 5, 

Left Lower Extremity: 5, Right Lower Extremity: 5


DTR: Achilles Tendon Left: 2+, Achilles Tendon Right: 2+, Bicep Left: 2+, Bicep 

Right: 2+, Brachioradialis Left: 2+, Brachioradialis Right: 2+, Patellar Left: 2

+, Patellar Right: 2+, Tricep Left: 2+, Tricep Right: 2+





- Psychiatric Exam


Psychiatric exam: Normal Affect, Normal Mood





- Skin


Skin Exam: Dry, Intact, Normal Color, Warm





Results





- Vital Signs


Recent Vital Signs: 


 Last Vital Signs











Temp  98.1 F   05/13/17 15:41


 


Pulse  71   05/13/17 15:41


 


Resp  20   05/13/17 15:41


 


BP  114/61   05/13/17 15:41


 


Pulse Ox  98   05/13/17 15:41














- Labs


Result Diagrams: 


 05/13/17 06:00





 05/13/17 06:00


Labs: 


 Laboratory Results - last 24 hr











  05/13/17 05/13/17





  06:00 06:00


 


WBC  7.3 


 


RBC  5.39 


 


Hgb  16.7 


 


Hct  50.5 


 


MCV  93.7 


 


MCH  30.9 


 


MCHC  33.0 


 


RDW  13.6 


 


Plt Count  152 


 


Sodium   142


 


Potassium   4.8


 


Chloride   106


 


Carbon Dioxide   28


 


Anion Gap   13


 


BUN   17


 


Creatinine   0.9


 


Est GFR ( Amer)   > 60


 


Est GFR (Non-Af Amer)   > 60


 


Random Glucose   103


 


Calcium   8.8


 


Total Bilirubin   0.8


 


AST   49


 


ALT   48


 


Alkaline Phosphatase   68


 


Total Protein   7.0


 


Albumin   3.8


 


Globulin   3.2


 


Albumin/Globulin Ratio   1.2














Assessment & Plan


(1) TIA (transient ischemic attack)


Assessment and Plan: 


The history and description of the symptoms may have been due to a posterior 

circulation TIA.  The patient is not back to baseline. His AICD does not appear 

to be MRI compatible, so we will repeat the CT head and obtain a CTA of the head

/neck to evaluate for vascular stenosis.  An echocardiogram with bubble study 

is recommended to rule out a cardiac thrombus.  Continue coumadin for an INR of 

2-3, and continue aspirin 81 mg daily for secondary stroke prevention. The 

patient is already on lipitor and this will be continued.  We discussed life-

style modification and risk-factor management.  He may need to be evaluated by 

PT/OT.  Otherwise, if the results of the aforementioned tests are normal, then 

he may be discharged home on his current medications and may follow-up with 

outpatient neurology.





Thank you very much for this consultation. 


Status: Acute   Priority: High

## 2017-05-13 NOTE — CON
DATE: 05/13/2017



He is hospitalized under the resident's care in room 417, bed 1.



HISTORY OF PRESENT ILLNESS:  This 52-year-old man is well known to me over the last couple of years. 
 He has congestive cardiomyopathy with chronic atrial fibrillation for which he takes carvedilol, lis
inopril, and a diuretic.  He has been orally anticoagulated using warfarin and gets his INR checked r
egularly.  Because of a severely depressed left ventricular systolic function, he has had an AICD imp
lanted more than 2 years back and gets regular checkups by his electrophysiologist.  Following initia
tion of heart failure treatment, the patient has had exceedingly improved effort tolerance and is abl
e to walk freely 10-12 blocks without any difficulty and can climb a flight of stairs and has not bee
n hospitalized with heart failure symptoms.  He came to the Emergency Room after experiencing a perio
d of lightheadedness along with mild slurring of speech which lasted for a very short period of time 
and promptly disappeared.



PHYSICAL EXAMINATION:

GENERAL:  Shows a middle-aged pleasant man, alert, awake, coherent. Able to express himself quite wel
l.  Has normal speech.  Is able to get in and out of bed unassisted. 

VITAL SIGNS:  Afebrile, heart rate of 68 beats per minute and regular, his blood pressure was 120/80 
mmHg.

NECK:  His jugular venous pressure was not elevated.

EXTREMITIES:  There was no edema of his lower extremities.  The pedal pulses were well felt.  There w
ere no carotid bruits.

HEART:  The apex was in the 5th space.  The first and second heart sounds were normal.  There was no 
murmur, no gallop.

LUNGS:  No rales.

ABDOMEN:  Soft.  Liver and spleen were not palpable.



In the Emergency Room, his electrocardiogram showed evidence of atrial fibrillation with a VVI paced 
rhythm.



LABORATORY DATA:  Show a hemoglobin and hematocrit of 16.7 g and 50.5% respectively.  His WBC count a
nd platelet count were within normal limits.  His BUN and creatinine were 15 and 0.8 mg percent.  His
 troponin was within normal range and his INR was 2.3.



IMPRESSION AND PLAN:  At this time is transient ischemic episode in a patient with chronic atrial fib
rillation, history of congestive cardiomyopathy, congestive heart failure, which is left ventricular 
systolic and chronic with status post automatic implantable cardioverter-defibrillator implant.  The 
patient does have an elevated BUN and elevated hemoglobin and hematocrit and given his history of con
gestive cardiac failure, would probably benefit by having a phlebotomy.  He is stable from cardiovasc
ular point of view. I have discussed his case with the residents.  His present schedule of medication
s is appropriate.





__________________________________________

Braden Kessler MD







cc:



DD: 05/13/2017 10:35:20  23

TT: 05/13/2017 14:18:55

Confirmation # [id number]

Dictation # [id number]

rn

## 2017-05-14 NOTE — CT
PROCEDURE:  CT HEAD WITHOUT CONTRAST.



HISTORY:

code stroke



COMPARISON:

None available. 



TECHNIQUE:

Axial computed tomography images were obtained through the head/brain 

without intravenous contrast.  



Radiation dose:



Total exam DLP = 867 mGy-cm.



This CT exam was performed using one or more of the following dose 

reduction techniques: Automated exposure control, adjustment of the 

mA and/or kV according to patient size, and/or use of iterative 

reconstruction technique.



FINDINGS:



HEMORRHAGE:

No intracranial hemorrhage. 



BRAIN:

No mass effect or edema.  Mild chronic white matter ischemic disease.



VENTRICLES:

Unremarkable. No hydrocephalus. 



CALVARIUM:

Unremarkable.



PARANASAL SINUSES:

Unremarkable as visualized. No significant inflammatory changes.



MASTOID AIR CELLS:

Unremarkable as visualized. No inflammatory changes.



OTHER FINDINGS:

None.



IMPRESSION:

Mild chronic white matter ischemic disease.

## 2017-05-14 NOTE — CP.PCM.PN
Subjective





- Date & Time of Evaluation


Date of Evaluation: 05/14/17


Time of Evaluation: 09:30





- Subjective


Subjective: 





Pt is being following up for Code stroke. 


No overnight event: afebrile


Pt is laying up in bed, NAD.   Pt continue to states  his symptoms of gait 

imbalance, dysarthria, and aphasia has resolved.  


Pt reports he is at his baseline neurological wise.  Denies new focal 

neurological deficits, dropping of face, extremity numbness/tingling.  


Denies fever, nausea, chills, sob, palpitation, headache.  





Objective





- Vital Signs/Intake and Output


Vital Signs (last 24 hours): 


 











Temp Pulse Resp BP Pulse Ox


 


 97.7 F   71   19   130/83   99 


 


 05/14/17 04:50  05/14/17 09:21  05/14/17 04:50  05/14/17 09:21  05/14/17 04:50











- Medications


Medications: 


 Current Medications





Aspirin (Aspirin Chewable)  81 mg PO DAILY [facility]


   Last Admin: 05/14/17 09:19 Dose:  81 mg


Atorvastatin Calcium (Lipitor)  40 mg PO DAILY [facility]


   Last Admin: 05/14/17 09:21 Dose:  40 mg


Carvedilol (Coreg)  12.5 mg PO BID [facility]


   Last Admin: 05/14/17 09:19 Dose:  12.5 mg


Digoxin (Lanoxin)  0.125 mg PO DAILY [facility]


   Last Admin: 05/14/17 09:20 Dose:  0.125 mg


Furosemide (Lasix)  10 mg PO DAILY [facility]


   Last Admin: 05/14/17 09:20 Dose:  10 mg


Lisinopril (Zestril)  10 mg PO DAILY [facility]


   Last Admin: 05/14/17 09:21 Dose:  10 mg


Ondansetron HCl (Zofran Inj)  4 mg IVP Q6 PRN


   PRN Reason: Nausea/Vomiting











- Labs


Labs: 


 





 05/14/17 06:00 





 05/13/17 06:00 





 











PT  19.7 SECONDS (9.6-11.2)  H  05/14/17  06:00    


 


INR  1.89  (0.92-1.08)  H  05/14/17  06:00    


 


APTT  32.4 SECONDS (23.3-32.5)   05/14/17  06:00    














- Additional Findings


Additional findings: 





- Constitutional


Appears: Non-toxic, No Acute Distress





- Head Exam


Head Exam: ATRAUMATIC





- Eye Exam


Eye Exam: EOMI, Normal appearance





- ENT Exam


ENT Exam: Mucous Membranes Moist





- Neck Exam


Neck Exam: Full ROM





- Respiratory Exam


Respiratory Exam: Clear to Ausculation Bilateral





- Cardiovascular Exam


Cardiovascular Exam: REGULAR RHYTHM, +S1, +S2.  absent: Murmur





- GI/Abdominal Exam


GI & Abdominal Exam: Soft, Normal Bowel Sounds.  absent: Distended, Tenderness





- Extremities Exam


Extremities Exam: Full ROM, Normal Capillary Refill.  absent: Calf Tenderness, 

Pedal Edema, Tenderness





- Back Exam


Back Exam: Full ROM, NORMAL INSPECTION.  absent: CVA tenderness (L), CVA 

tenderness (R)





- Neurological Exam


Neurological Exam: Alert, Awake, CN II-XII Intact, Normal Gait, Oriented x3


Neuro motor strength exam: Left Upper Extremity: 4, Right Upper Extremity: 4





- Psychiatric Exam


Psychiatric exam: Anxious, Normal Affect, Normal Mood





- Skin


Skin Exam: Normal Color, Warm





Assessment and Plan





- Assessment and Plan (Free Text)


Assessment: 





51 y/o M with Hx of CVA 2015, A fib, HTN, CHF, dilated cardiomyopathy ( EF 10-15

% on last ECHO 3/17, s/p AICD placement 2015), Polycythemia Vera presenting 

with diplopia and mild dysarthria and aphasia which started at 4 pm as per 

patient, lasted for a few minutes and resolved on its own admitted for Code 

Stroke. 





Diplopia w/ mild dysarthia


-resolved, most likely TIA


-Neurology consult appreciated, Dr. Lemos


   due to AICD MRA, MRI held, recommend CTa of head and neck


   pending repeat ct head, cta head and neck, if unremarkable stable to d/c and 

f/u outpt


-Code stroke


-NIHSS score 2, swallow screen passed


-ct head w/o contrast: no acute hemorrhage


-cxr: no acute disease


-continue with aspirin, lipitor, warfarin 2.5mg


-awaiting PT/OT eval, ct a head and neck, and repeat ct head





HTN: 


BP controlled


c/w lisinopril


c/w lipitor 40 mg daily





Afib


-ACID intact


-ekg in ED: ventricular pace


-c/w warfarin 2.5mg


-f/u repeat INR





CHF


EF 10-15% with severe global hypokinesia of ventricle on last ECHO 3/17


c/w lasix daily, ACE, BB





Polycythemia Vera


-awaiting heme/onc consult, Dr. Montoya


-history of phlebotomy


-H:H 17:50





PPx


DVT - anticoagulated with warfarin for a fib





Diet 


HH

## 2017-05-15 NOTE — CT
PROCEDURE:  CT Angiography of the neck with contrast



HISTORY:

code stroke, neurology recommendation



COMPARISON:

None available. 



TECHNIQUE:

Contiguous axial images of the neck were obtained from the level of 

the skull-base to the superior mediastinum in the arteriographic 

phase of enhancement. Coronal and sagittal reformats or also 

generated. 



IV contrast dose: 100 cc of Visipaque



Radiation Dose - DLP: 2303 mGy-cm 



This CT exam was performed using one or more of the following dose 

reduction techniques: Automated exposure control, adjustment of the 

mA and/or kV according to patient size, and/or use of iterative 

reconstruction technique.



FINDINGS:



RIGHT CAROTID ARTERIES:

Common Carotid Artery: Normal.



Carotid Bifurcation: Normal.



Internal Carotid Artery:Normal.



External Carotid Artery (proximal branches): Normal.



LEFT CAROTID ARTERIES:

Common Carotid Artery: Normal.



Carotid Bifurcation: Normal.



Internal Carotid Artery:Normal.



External Carotid Artery (proximal branches): Normal.



VERTEBRAL ARTERIES:

Right Vertebral Artery: Normal.



Left Vertebral Artery: Normal.



OTHER FINDINGS:

None.



IMPRESSION:

Normal CT Angiography of the neck.



PROCEDURE:  CT Angiography of the Brain.



HISTORY:

code stroke, neurology recommendation



COMPARISON:

None available. 



TECHNIQUE:

CT angiography of the intracranial arteries was performed. Coronal 

and sagittal maximum intensity projection reformated images were 

generated.



This CT exam was performed using one or more of the following dose 

reduction techniques: Automated exposure control, adjustment of the 

mA and/or kV according to patient size, and/or use of iterative 

reconstruction technique.



FINDINGS:



INTERNAL CEREBRAL ARTERIES:

Unremarkable. The skull base, petrous, cavernous and supraclinoid 

segments are bilaterally widely patient. 



ANTERIOR CEREBRAL ARTERIES:

Unremarkable. A1 and A2 segments are widely patent. Smaller distal 

branches unremarkable, as visualized.



MIDDLE CEREBRAL ARTERIES:

Unremarkable. M1 and M2 segments are widely patent. Perisylvian 

branches grossly symmetric.



POSTERIOR CIRCULATION:

Basilar Artery: Unremarkable.



Distal Vertebral Arteries: Unremarkable.



Posterior Cerebral Arteries: Unremarkable.



Posterior Inferior Cerebellar Arteries: Unremarkable.



ANEURYSM/ VASCULAR MALFORMATIONS:

None.



OTHER FINDINGS:

The report concurs with the preliminary [facility] report 



IMPRESSION:

Unremarkable CT Angiography of the Brain.

## 2017-05-15 NOTE — CARD
--------------- APPROVED REPORT --------------





EXAM: Two-dimensional and M-mode echocardiogram with Doppler and 

color Doppler.



Other Information 

Quality : GoodRhythm : Pacemaker



INDICATION

CVA/TIA Thrombus 



Echo Enhancing Agent

Indication: Rule out thrombus,ASD,PFO

Agent/Amount Used: Agitated Saline



Surgery/Intervention

ICD/Pacemaker: 



2D DIMENSIONS 

IVSd1.47   (0.7-1.1cm)LVDd6.36   (3.9-5.9cm)

LVOT Diameter2.17   (1.8-2.4cm)PWd1.05   (0.7-1.1cm)

IVSs1.23   (0.8-1.2cm)LVDs6.33   (2.5-4.0cm)

FS (%) 0.4   %PWs1.07   (0.8-1.2cm)

LVEF (%)25.0   (>50%)



M-Mode DIMENSIONS 

Left Atrium (MM)5.69   (2.5-4.0cm)IVSd0.93   (0.7-1.1cm)

Aortic Root3.04   (2.2-3.7cm)LVDd7.15   (4.0-5.6cm)

Aortic Cusp Exc.2.05   (1.5-2.0cm)PWd0.96   (0.7-1.1cm)

IVSs1.46   cmFS (%) 21   %

LVDs5.63   (2.0-3.8cm)PWs1.29   cm



Mitral Valve

E/A ratio0.0



TDI

E/Lateral E'0.0E/Medial E'0.0



Pulmonary Valve

PV Peak Mjlhvzkz62.6cm/s



 LEFT VENTRICLE 

The Left Ventricle is moderately dilated.

There is borderline concentric left ventricular hypertrophy.

The systolic function is severely impaired.

There is global hypokinesis of the left ventricle.

Patient is in A Fib

No left ventricle thrombus noted on this study.



 RIGHT VENTRICLE 

The right ventricle is normal size.

There is normal right ventricular wall thickness.

The right ventricular systolic function is normal.

There is a pacemaker lead in the right ventricle.



 ATRIA 

The left atrium is moderately dilated.

The right atrium is mildly dilated.

The interatrial septum is intact with no evidence for an atrial 

septal defect.



 AORTIC VALVE 

The aortic valve is mildly thickened.

No aortic regurgitation is present.

There is no aortic valvular stenosis.



 MITRAL VALVE 

The mitral valve is moderately thickened.

There is no mitral valve stenosis.

There is no mitral valve regurgitation noted.



 TRICUSPID VALVE 

The tricuspid valve is normal in structure and function.

There is no tricuspid valve regurgitation noted.



 PULMONIC VALVE 

The pulmonary valve is normal in structure and function.

There is no pulmonic valvular regurgitation.



 GREAT VESSELS 

The aortic root displays moderate sclerocalcific changes of the aortic

The IVC was not visualized.



 PERICARDIAL EFFUSION 

The pericardium appears normal.



<Conclusion>

The Left Ventricle is moderately dilated.

There is borderline concentric left ventricular hypertrophy.

The systolic function is severely impaired.

There is global hypokinesis of the left ventricle.

No left ventricle thrombus noted on this study.

## 2017-05-15 NOTE — CP.PCM.DIS
Provider





- Provider


Date of Admission: 


05/12/17 18:09





Attending physician: 


Nina Thao MD





Primary care physician: 





Dr Timur Quinn


Consults: 





Cardiology Dr Vance


Neurology Dr Lemos


Time Spent in preparation of Discharge (in minutes): 30





Diagnosis





- Discharge Diagnosis


(1) TIA (transient ischemic attack)


Status: Resolved   Priority: High   


Comment: CT head/CT angiogram no acute changes. Patient asymptomatic   





(2) Congestive heart failure (CHF)


Status: Chronic   


Comment: Stable. Evaluated by Cardiology. Cont home meds. F/U as outpatient   





Hospital Course





- Lab Results


Lab Results: 


 Most Recent Lab Values











WBC  7.4 K/uL (4.8-10.8)   05/14/17  06:00    


 


RBC  5.51 Mil/uL (4.40-5.90)   05/14/17  06:00    


 


Hgb  17.1 g/dL (12.0-18.0)   05/14/17  06:00    


 


Hct  51.0 % (35.0-51.0)   05/14/17  06:00    


 


MCV  92.5 fl (80.0-94.0)   05/14/17  06:00    


 


MCH  31.0 pg (27.0-31.0)   05/14/17  06:00    


 


MCHC  33.6 g/dL (33.0-37.0)   05/14/17  06:00    


 


RDW  13.2 % (11.5-14.5)   05/14/17  06:00    


 


Plt Count  157 K/uL (130-400)   05/14/17  06:00    


 


MPV  10.0 fl (7.2-11.7)   05/12/17  17:50    


 


Neut % (Auto)  53.0 % (50.0-75.0)   05/12/17  17:50    


 


Lymph % (Auto)  32.9 % (20.0-40.0)   05/12/17  17:50    


 


Mono % (Auto)  6.9 % (0.0-10.0)   05/12/17  17:50    


 


Eos % (Auto)  6.5 % (0.0-4.0)  H  05/12/17  17:50    


 


Baso % (Auto)  0.7 % (0.0-2.0)   05/12/17  17:50    


 


Neut #  4.0 K/uL (1.8-7.0)   05/12/17  17:50    


 


Lymph #  2.4 K/uL (1.0-4.3)   05/12/17  17:50    


 


Mono #  0.5 K/uL (0.0-0.8)   05/12/17  17:50    


 


Eos #  0.5 K/uL (0.0-0.7)   05/12/17  17:50    


 


Baso #  0.0 K/uL (0.0-0.2)   05/12/17  17:50    


 


PT  23.1 SECONDS (9.6-11.2)  H  05/15/17  05:40    


 


INR  2.22  (0.92-1.08)  H  05/15/17  05:40    


 


APTT  32.4 SECONDS (23.3-32.5)   05/14/17  06:00    


 


Sodium  142 mmol/l (132-148)   05/13/17  06:00    


 


Potassium  4.8 MMOL/L (3.6-5.0)   05/13/17  06:00    


 


Chloride  106 mmol/L ()   05/13/17  06:00    


 


Carbon Dioxide  28 mmol/L (22-30)   05/13/17  06:00    


 


Anion Gap  13  (10-20)   05/13/17  06:00    


 


BUN  17 mg/dl (9-20)   05/13/17  06:00    


 


Creatinine  0.9 mg/dL (0.8-1.5)   05/13/17  06:00    


 


Est GFR ( Amer)  > 60   05/13/17  06:00    


 


Est GFR (Non-Af Amer)  > 60   05/13/17  06:00    


 


POC Glucose (mg/dL)  107 mg/dL ()   05/12/17  17:23    


 


Random Glucose  103 mg/dL ()   05/13/17  06:00    


 


Calcium  8.8 mg/dL (8.4-10.2)   05/13/17  06:00    


 


Total Bilirubin  0.8 mg/dl (0.2-1.3)   05/13/17  06:00    


 


AST  49 U/L (17-59)   05/13/17  06:00    


 


ALT  48 U/L (21-72)   05/13/17  06:00    


 


Alkaline Phosphatase  68 U/L ()   05/13/17  06:00    


 


Troponin I  0.0160 ng/mL (0.00-0.120)   05/12/17  17:50    


 


Total Protein  7.0 G/DL (6.3-8.2)   05/13/17  06:00    


 


Albumin  3.8 g/dL (3.5-5.0)   05/13/17  06:00    


 


Globulin  3.2 gm/dL (2.2-3.9)   05/13/17  06:00    


 


Albumin/Globulin Ratio  1.2  (1.0-2.1)   05/13/17  06:00    


 


Triglycerides  240 mg/DL (0-149)  H D 05/12/17  17:50    


 


Cholesterol  109 mg/dL (0-199)   05/12/17  17:50    


 


LDL Cholesterol Direct  43 mg/dL (0-129)   05/12/17  17:50    


 


HDL Cholesterol  35 MG/DL (30-70)   05/12/17  17:50    


 


Blood Type  O POSITIVE   05/12/17  17:50    


 


Antibody Screen  Negative   05/12/17  17:50    


 


BBK History Checked  Patient has bt   05/12/17  17:50    














- Hospital Course


Hospital Course: 





51 y/o M with PMHx of CHF, A.fib, Policytemia, CVA and ACID presented to ED 

because of dizziness. CT head in ED showed no acute changes and patient was 

consulted with Neurology and cardiology, MRI of the head unable to perform due 

to patient's ACID and CT angio and repeat CT head were recommended. Patient 

symptoms resolved while he was in the telemetry unit and today reports for 

previous studies are unremarkable. After reviewing labs and studies patient PE, 

and discussion with specialist involved in the care of the patient is decided 

to DC patient home and f/u as outpatient with the diagnosed of TIA, pending 

final bubble Echo results.





Discharge meds:


Famotidine [Famotidine]   20 mg PO DAILY   


Warfarin [Coumadin]   2.5 mg PO DAILY   


Furosemide [Furosemide]   20 mg PO DAILY   


Aspirin [Aspirin Chewable]   81 mg PO DAILY   


Carvedilol [Coreg]   12.5 mg PO BID   


Lisinopril [Zestril]   10 mg PO DAILY   


Digoxin [Lanoxin]   0.125 mg PO   





Discharge Exam





- Head Exam


Head Exam: ATRAUMATIC, NORMAL INSPECTION





- Eye Exam


Eye Exam: PERRL





- ENT Exam


ENT Exam: Mucous Membranes Moist





- Respiratory Exam


Respiratory Exam: Clear to PA & Lateral, NORMAL BREATHING PATTERN, UNREMARKABLE





- Cardiovascular Exam


Cardiovascular Exam: REGULAR RHYTHM.  absent: Gallop, Systolic Murmur





- GI/Abdominal Exam


GI & Abdominal Exam: Normal Bowel Sounds, Unremarkable.  absent: Distended





- Extremities Exam


Extremities exam: full ROM





- Neurological Exam


Neurological exam: Alert, Oriented x3





- Psychiatric Exam


Psychiatric exam: Normal Affect, Normal Mood





- Skin


Skin Exam: Normal Color, Warm





Discharge Plan





- Follow Up Plan


Condition: GOOD


Disposition: HOME/ ROUTINE


Instructions:  Stroke (DC)


Additional Instructions: 


F/U with PMD Dr Quinn at [facility] on June 12, 10:20am


F/U with Cardiologist, Dr Vance on May 23, 2017  11:15 am


F/U with Neurology, Dr Lemos within 1 week. Please call for appt

## 2017-07-08 ENCOUNTER — HOSPITAL ENCOUNTER (EMERGENCY)
Dept: HOSPITAL 14 - H.ER | Age: 52
Discharge: HOME | End: 2017-07-08
Payer: MEDICAID

## 2017-07-08 VITALS
DIASTOLIC BLOOD PRESSURE: 63 MMHG | HEART RATE: 70 BPM | RESPIRATION RATE: 18 BRPM | SYSTOLIC BLOOD PRESSURE: 111 MMHG | OXYGEN SATURATION: 99 % | TEMPERATURE: 97.1 F

## 2017-07-08 VITALS — BODY MASS INDEX: 30.9 KG/M2

## 2017-07-08 VITALS — HEART RATE: 71 BPM

## 2017-07-08 DIAGNOSIS — Z79.82: ICD-10-CM

## 2017-07-08 DIAGNOSIS — I11.0: ICD-10-CM

## 2017-07-08 DIAGNOSIS — Z79.01: ICD-10-CM

## 2017-07-08 DIAGNOSIS — K59.00: Primary | ICD-10-CM

## 2017-07-08 DIAGNOSIS — I48.91: ICD-10-CM

## 2017-07-08 NOTE — RAD
PROCEDURE:  Radiographs of the chest and abdomen (obstructive series)



HISTORY:

abdominal pain  



COMPARISON:

Comparison made with prior study dated 05/12/2017



TECHNIQUE:

AP radiograph of the chest, with upright and supine radiographs of 

the abdomen.



FINDINGS:



CHEST:

No infiltrate or effusion. No evidence of pneumothorax. Heart size is 

borderline/mildly enlarged. No change bipolar pacemaker/defibrillator 



ABDOMEN AND PELVIS:

No gross free intraperitoneal air seen under the diaphragmatic 

surfaces. No evidence of acute mechanical bowel obstruction.  

Moderate amount stool seen throughout colon suggesting mild fecal 

retention/constipation. 



Radiopaque suture material or clips right upper quadrant of the attic 

consistent with prior cholecystectomy. 



Small calcific densities overlying the inferior true pelvis may 

represent prostatic calcifications. 



Mild multilevel degenerative spondylosis of the lumbar spine 



Metallic 



IMPRESSION:

No acute cardiopulmonary disease.  Findings suggest mild fecal 

retention/constipation.

## 2017-07-08 NOTE — ED PDOC
HPI: Abdomen


Time Seen by Provider: 17 09:27


Chief Complaint (Nursing): GI Problem


Chief Complaint (Provider): Constipation


History Per: Patient


History/Exam Limitations: no limitations


Onset/Duration Of Symptoms: Days (x2 days)


Outside of [place] travel?: No


Current Symptoms Are (Timing): Still Present


Associated Symptoms: denies: Vomiting


Additional Complaint(s): 


Tex Plascencia, a 52 year old male, presents to the ED complaining of 

constipation x2 days. The patient reports that his symptoms are similar to ones 

he had last year but without the abdominal pain.The patient states that during 

his visit to the ED last year he was given enema to help him to release waste 

freely. He states that he did not think to take enema at home.He further states 

that in the shower he tried to remove stool with his finger but could not get 

anything out.





PMD: Dr. Timur Quinn








Past Medical History


Reviewed: Historical Data, Nursing Documentation, Vital Signs


Vital Signs: 


 Last Vital Signs











Temp  97.1 F L  17 09:11


 


Pulse  70   17 09:11


 


Resp  18   17 09:11


 


BP  111/63   17 09:11


 


Pulse Ox  99   17 10:15














- Medical History


PMH: Atrial Fibrillation, Cardia Arrhythmia, CHF, HTN


   Denies: Chronic Kidney Disease





- Surgical History


Surgical History: Cholecystectomy


   Denies: Pacemaker





- Family History


Family History: States: Unknown Family Hx





- Living Arrangements


Living Arrangements: Alone





- Home Medications


Home Medications: 


 Ambulatory Orders











 Medication  Instructions  Recorded


 


Famotidine 20 mg PO DAILY 14


 


Warfarin [Coumadin] 2.5 mg PO DAILY 14


 


Furosemide 20 mg PO DAILY 14


 


Aspirin [Aspirin Chewable] 81 mg PO DAILY 17


 


Carvedilol [Coreg] 12.5 mg PO BID 17


 


Lisinopril [Zestril] 10 mg PO DAILY 17


 


Digoxin [Lanoxin] 0.125 mcg PO DAILY 17


 


Atorvastatin [Lipitor] 10 mg PO DAILY 17














- Allergies


Allergies/Adverse Reactions: 


 Allergies











Allergy/AdvReac Type Severity Reaction Status Date / Time


 


No Known Allergies Allergy   Verified 17 09:19














Review of Systems


Gastrointestinal: Positive for: Constipation.  Negative for: Vomiting, 

Abdominal Pain





Physical Exam





- Reviewed


Nursing Documentation Reviewed: Yes


Vital Signs Reviewed: Yes





- Physical Exam


Appears: Positive for: Non-toxic, No Acute Distress


Head Exam: Positive for: ATRAUMATIC, NORMAL INSPECTION, NORMOCEPHALIC


Cardiovascular/Chest: Positive for: Regular Rate, Rhythm, Chest Non Tender.  

Negative for: Tachycardia


Respiratory: Positive for: Normal Breath Sounds.  Negative for: Wheezing, 

Respiratory Distress


Gastrointestinal/Abdominal: Positive for: Normal Exam, Bowel Sounds, Soft.  

Negative for: Tenderness, Guarding, Rebound


Rectal: Positive for: Normal Exam, Rectal Tone Is: (Normal tone), Other (No 

bolus felt; Brown stools.).  Negative for: Black Stool, Hemorrhoids


Neurologic/Psych: Positive for: Alert, Oriented, Gait





- ECG


O2 Sat by Pulse Oximetry: 99 (RA)


Pulse Ox Interpretation: Normal





- Radiology


X-Ray: Viewed By Me


X-Ray Interpretation: No Acute Disease





Medical Decision Making


Medical Decision Makin Initial Impression: 52 year old male presenting with constipation





Initial Impression:


* 





patient spontaneously evacuated large amount of stool (subjectively). will 

cancel enema and discharge.








Disposition





- Clinical Impression


Clinical Impression: 


 Bowel movement symptom








- Patient ED Disposition


Is Patient to be Admitted: No


Doctor Will See Patient In The: Office


Counseled Patient/Family Regarding: Diagnosis, Need For Followup





- Disposition


Referrals: 


Timur Quinn MD [Family Provider] - 


Disposition: Routine/Home


Disposition Time: 10:35


Condition: IMPROVED


Instructions:  High Fiber Diet (ED)





- POA


Present On Arrival: None

## 2018-03-29 ENCOUNTER — HOSPITAL ENCOUNTER (EMERGENCY)
Dept: HOSPITAL 14 - H.ER | Age: 53
Discharge: HOME | End: 2018-03-29
Payer: MEDICARE

## 2018-03-29 VITALS
HEART RATE: 77 BPM | OXYGEN SATURATION: 98 % | DIASTOLIC BLOOD PRESSURE: 71 MMHG | TEMPERATURE: 98.9 F | RESPIRATION RATE: 16 BRPM | SYSTOLIC BLOOD PRESSURE: 134 MMHG

## 2018-03-29 VITALS — HEART RATE: 71 BPM

## 2018-03-29 VITALS — BODY MASS INDEX: 31.4 KG/M2

## 2018-03-29 DIAGNOSIS — Z95.0: ICD-10-CM

## 2018-03-29 DIAGNOSIS — I50.9: Primary | ICD-10-CM

## 2018-03-29 DIAGNOSIS — Z79.82: ICD-10-CM

## 2018-03-29 DIAGNOSIS — Z79.01: ICD-10-CM

## 2018-03-29 DIAGNOSIS — I11.0: ICD-10-CM

## 2018-03-29 LAB
ALBUMIN SERPL-MCNC: 3.9 G/DL (ref 3.5–5)
ALBUMIN/GLOB SERPL: 1.1 {RATIO} (ref 1–2.1)
ALT SERPL-CCNC: 45 U/L (ref 21–72)
AST SERPL-CCNC: 29 U/L (ref 17–59)
BASOPHILS # BLD AUTO: 0 K/UL (ref 0–0.2)
BASOPHILS NFR BLD: 0.3 % (ref 0–2)
BNP SERPL-MCNC: 2450 PG/ML (ref 0–900)
BUN SERPL-MCNC: 15 MG/DL (ref 9–20)
CALCIUM SERPL-MCNC: 8.8 MG/DL (ref 8.4–10.2)
EOSINOPHIL # BLD AUTO: 0.1 K/UL (ref 0–0.7)
EOSINOPHIL NFR BLD: 0.3 % (ref 0–4)
ERYTHROCYTE [DISTWIDTH] IN BLOOD BY AUTOMATED COUNT: 13.7 % (ref 11.5–14.5)
GFR NON-AFRICAN AMERICAN: > 60
HGB BLD-MCNC: 17.2 G/DL (ref 12–18)
INR PPP: 2.2 (ref 0.9–1.2)
LYMPHOCYTE: 12 % (ref 20–50)
LYMPHOCYTES # BLD AUTO: 1.1 K/UL (ref 1–4.3)
LYMPHOCYTES NFR BLD AUTO: 7.1 % (ref 20–40)
MCH RBC QN AUTO: 31.5 PG (ref 27–31)
MCHC RBC AUTO-ENTMCNC: 34.4 G/DL (ref 33–37)
MCV RBC AUTO: 91.6 FL (ref 80–94)
MONOCYTE: 7 % (ref 0–10)
MONOCYTES # BLD: 0.8 K/UL (ref 0–0.8)
MONOCYTES NFR BLD: 5.4 % (ref 0–10)
NEUTROPHILS # BLD: 13.3 K/UL (ref 1.8–7)
NEUTROPHILS NFR BLD AUTO: 80 % (ref 42–75)
NEUTROPHILS NFR BLD AUTO: 86.9 % (ref 50–75)
NEUTS BAND NFR BLD: 1 % (ref 0–2)
NRBC BLD AUTO-RTO: 0 % (ref 0–0)
PLATELET # BLD EST: NORMAL 10*3/UL
PLATELET # BLD: 153 K/UL (ref 130–400)
PMV BLD AUTO: 10.4 FL (ref 7.2–11.7)
PROTHROMBIN TIME: 24.2 SECONDS (ref 9.8–13.1)
RBC # BLD AUTO: 5.47 MIL/UL (ref 4.4–5.9)
RBC MORPH BLD: NORMAL
TOTAL CELLS COUNTED BLD: 100
WBC # BLD AUTO: 15.3 K/UL (ref 4.8–10.8)

## 2018-03-29 PROCEDURE — 96374 THER/PROPH/DIAG INJ IV PUSH: CPT

## 2018-03-29 PROCEDURE — 85025 COMPLETE CBC W/AUTO DIFF WBC: CPT

## 2018-03-29 PROCEDURE — 99285 EMERGENCY DEPT VISIT HI MDM: CPT

## 2018-03-29 PROCEDURE — 71046 X-RAY EXAM CHEST 2 VIEWS: CPT

## 2018-03-29 PROCEDURE — 83880 ASSAY OF NATRIURETIC PEPTIDE: CPT

## 2018-03-29 PROCEDURE — 84484 ASSAY OF TROPONIN QUANT: CPT

## 2018-03-29 PROCEDURE — 80162 ASSAY OF DIGOXIN TOTAL: CPT

## 2018-03-29 PROCEDURE — 85610 PROTHROMBIN TIME: CPT

## 2018-03-29 PROCEDURE — 80053 COMPREHEN METABOLIC PANEL: CPT

## 2018-03-29 PROCEDURE — 93005 ELECTROCARDIOGRAM TRACING: CPT

## 2018-03-29 NOTE — CARD
--------------- APPROVED REPORT --------------





EKG Measurement

Heart [id number]

[id number]

[id number]

[id number]



<Conclusion>

Ventricular-paced rhythm with frequent premature ventricular complexes

Underlying rhythm is A Fib

Abnormal ECG

## 2018-03-29 NOTE — ED PDOC
HPI: SOB/CHF/COPD


Time Seen by Provider: 03/29/18 06:03


Chief Complaint (Nursing): Shortness Of Breath


History Per: Patient


Onset/Duration Of Symptoms: Days (2)


Exacerbating Factor(s): Exertion


Current Respiratory Medications: See Home Med List


Severity: Moderate


Pain Scale Rating Of: 0


Associated Symptoms: denies: Heart Racing, Ankle/Leg Swelling, Dizziness


Additional Complaint(s): 





Generalized fatigue and weakness assoc with SOB x 2 days. Denies chest pain or 

palpitations. Denies swelling of ankles. Denies cough or fever.





Past Medical History


Vital Signs: 


 Last Vital Signs











Temp  98.9 F   03/29/18 06:11


 


Pulse  73   03/29/18 07:37


 


Resp  16   03/29/18 07:37


 


BP  125/71   03/29/18 07:32


 


Pulse Ox  97   03/29/18 07:37














- Medical History


PMH: Atrial Fibrillation, Cardia Arrhythmia, CHF, HTN


   Denies: Chronic Kidney Disease





- Surgical History


Surgical History: Cholecystectomy, Pacemaker





- Family History


Family History: States: Unknown Family Hx





- Home Medications


Home Medications: 


 Ambulatory Orders











 Medication  Instructions  Recorded


 


Famotidine 20 mg PO DAILY 03/25/14


 


Warfarin [Coumadin] 2.5 mg PO DAILY 08/11/14


 


Furosemide 20 mg PO DAILY 12/31/14


 


Aspirin [Aspirin Chewable] 81 mg PO DAILY 02/01/17


 


Carvedilol [Coreg] 12.5 mg PO BID 02/01/17


 


Lisinopril [Zestril] 10 mg PO DAILY 02/01/17


 


Digoxin 0.125 mcg PO DAILY 03/27/17


 


Atorvastatin [Lipitor] 10 mg PO DAILY 05/12/17














- Allergies


Allergies/Adverse Reactions: 


 Allergies











Allergy/AdvReac Type Severity Reaction Status Date / Time


 


No Known Allergies Allergy   Verified 03/29/18 06:11














Review of Systems


ROS Statement: Except As Marked, All Systems Reviewed And Found Negative


Constitutional: Negative for: Fever


Cardiovascular: Negative for: Chest Pain, Palpitations


Respiratory: Positive for: Shortness of Breath





Physical Exam





- Reviewed


Nursing Documentation Reviewed: Yes


Vital Signs Reviewed: Yes





- Physical Exam


Appears: Positive for: Non-toxic, No Acute Distress


Head Exam: Positive for: ATRAUMATIC, NORMAL INSPECTION, NORMOCEPHALIC


Skin: Positive for: Normal Color, Warm, DRY


Eye Exam: Positive for: EOMI, Normal appearance, PERRL


ENT: Positive for: Normal ENT Inspection


Neck: Positive for: Normal, Painless ROM


Cardiovascular/Chest: Positive for: Regular Rate, Rhythm


Respiratory: Positive for: CNT, Normal Breath Sounds


Gastrointestinal/Abdominal: Positive for: Normal Exam, Bowel Sounds, Soft


Back: Positive for: Normal Inspection


Extremity: Positive for: Normal ROM.  Negative for: Pedal Edema


Neurologic/Psych: Positive for: Alert, Oriented





- Laboratory Results


Result Diagrams: 


 03/29/18 06:43





 03/29/18 06:43





- ECG


O2 Sat by Pulse Oximetry: 98





Medical Decision Making


Medical Decision Making: 





Discussed with Dr. Kessler, pt to be diuresed and he will f/u as outpt.





Disposition





- Clinical Impression


Clinical Impression: 


 Congestive heart failure (CHF)








- Patient ED Disposition


Is Patient to be Admitted: No





- Disposition


Disposition: Routine/Home


Disposition Time: 08:43


Condition: FAIR


Instructions:  Heart Failure, Adult


Forms:  [facility] (English)

## 2018-03-29 NOTE — RAD
HISTORY:

SOB  



COMPARISON:

5/12/2017



TECHNIQUE:

Chest PA and lateral



FINDINGS:



LUNGS:

No active pulmonary disease.



PLEURA:

No significant pleural effusion identified. No pneumothorax apparent.



CARDIOVASCULAR:

AICD. No congestive change. Normal heart size.



OSSEOUS STRUCTURES:

No significant abnormalities.



VISUALIZED UPPER ABDOMEN:

Normal.



OTHER FINDINGS:

None.



IMPRESSION:

No active disease.